# Patient Record
Sex: FEMALE | Race: WHITE | HISPANIC OR LATINO | Employment: OTHER | URBAN - METROPOLITAN AREA
[De-identification: names, ages, dates, MRNs, and addresses within clinical notes are randomized per-mention and may not be internally consistent; named-entity substitution may affect disease eponyms.]

---

## 2023-01-01 ENCOUNTER — HOME CARE VISIT (OUTPATIENT)
Dept: HOME HOSPICE | Facility: HOSPICE | Age: 88
End: 2023-01-01

## 2023-01-01 ENCOUNTER — HOSPICE ADMISSION (OUTPATIENT)
Dept: HOME HOSPICE | Facility: HOSPICE | Age: 88
End: 2023-01-01

## 2023-01-01 ENCOUNTER — HOME CARE VISIT (OUTPATIENT)
Dept: HOME HEALTH SERVICES | Facility: HOME HEALTHCARE | Age: 88
End: 2023-01-01

## 2023-01-30 ENCOUNTER — APPOINTMENT (EMERGENCY)
Dept: RADIOLOGY | Facility: HOSPITAL | Age: 88
End: 2023-01-30

## 2023-01-30 ENCOUNTER — HOME CARE VISIT (OUTPATIENT)
Dept: HOME HEALTH SERVICES | Facility: HOME HEALTHCARE | Age: 88
End: 2023-01-30

## 2023-01-30 ENCOUNTER — HOSPITAL ENCOUNTER (EMERGENCY)
Facility: HOSPITAL | Age: 88
Discharge: HOME | End: 2023-01-30
Attending: EMERGENCY MEDICINE

## 2023-01-30 VITALS
TEMPERATURE: 98.1 F | WEIGHT: 125 LBS | RESPIRATION RATE: 21 BRPM | HEIGHT: 64 IN | OXYGEN SATURATION: 94 % | BODY MASS INDEX: 21.34 KG/M2 | DIASTOLIC BLOOD PRESSURE: 89 MMHG | SYSTOLIC BLOOD PRESSURE: 181 MMHG | HEART RATE: 50 BPM

## 2023-01-30 DIAGNOSIS — R77.8 ELEVATED TROPONIN LEVEL NOT DUE MYOCARDIAL INFARCTION: ICD-10-CM

## 2023-01-30 DIAGNOSIS — F03.90 DEMENTIA (HCC): Primary | ICD-10-CM

## 2023-01-30 DIAGNOSIS — R00.1 BRADYCARDIA: ICD-10-CM

## 2023-01-30 DIAGNOSIS — R62.7 ADULT FAILURE TO THRIVE: ICD-10-CM

## 2023-01-30 DIAGNOSIS — I10 HYPERTENSION, UNSPECIFIED TYPE: ICD-10-CM

## 2023-01-30 LAB
ALBUMIN SERPL BCP-MCNC: 2.5 G/DL (ref 3.5–5)
ALP SERPL-CCNC: 103 U/L (ref 46–116)
ALT SERPL W P-5'-P-CCNC: 22 U/L (ref 12–78)
ANION GAP SERPL CALCULATED.3IONS-SCNC: 7 MMOL/L (ref 4–13)
AST SERPL W P-5'-P-CCNC: 26 U/L (ref 5–45)
BASE EXCESS BLDA CALC-SCNC: 4 MMOL/L (ref -2–3)
BASOPHILS # BLD AUTO: 0.01 THOUSANDS/ÂΜL (ref 0–0.1)
BASOPHILS NFR BLD AUTO: 0 % (ref 0–1)
BILIRUB SERPL-MCNC: 0.5 MG/DL (ref 0.2–1)
BUN SERPL-MCNC: 28 MG/DL (ref 5–25)
CA-I BLD-SCNC: 1.17 MMOL/L (ref 1.12–1.32)
CALCIUM ALBUM COR SERPL-MCNC: 9.6 MG/DL (ref 8.3–10.1)
CALCIUM SERPL-MCNC: 8.4 MG/DL (ref 8.3–10.1)
CARDIAC TROPONIN I PNL SERPL HS: 45 NG/L
CHLORIDE SERPL-SCNC: 111 MMOL/L (ref 96–108)
CK SERPL-CCNC: 25 U/L (ref 26–192)
CO2 SERPL-SCNC: 30 MMOL/L (ref 21–32)
CREAT SERPL-MCNC: 1.23 MG/DL (ref 0.6–1.3)
EOSINOPHIL # BLD AUTO: 0.08 THOUSAND/ÂΜL (ref 0–0.61)
EOSINOPHIL NFR BLD AUTO: 1 % (ref 0–6)
ERYTHROCYTE [DISTWIDTH] IN BLOOD BY AUTOMATED COUNT: 13.2 % (ref 11.6–15.1)
GFR SERPL CREATININE-BSD FRML MDRD: 36 ML/MIN/1.73SQ M
GLUCOSE SERPL-MCNC: 160 MG/DL (ref 65–140)
GLUCOSE SERPL-MCNC: 164 MG/DL (ref 65–140)
HCO3 BLDA-SCNC: 29.7 MMOL/L (ref 24–30)
HCT VFR BLD AUTO: 37.7 % (ref 34.8–46.1)
HCT VFR BLD CALC: 34 % (ref 34.8–46.1)
HGB BLD-MCNC: 12 G/DL (ref 11.5–15.4)
HGB BLDA-MCNC: 11.6 G/DL (ref 11.5–15.4)
IMM GRANULOCYTES # BLD AUTO: 0.02 THOUSAND/UL (ref 0–0.2)
IMM GRANULOCYTES NFR BLD AUTO: 0 % (ref 0–2)
LYMPHOCYTES # BLD AUTO: 0.98 THOUSANDS/ÂΜL (ref 0.6–4.47)
LYMPHOCYTES NFR BLD AUTO: 17 % (ref 14–44)
MCH RBC QN AUTO: 30.1 PG (ref 26.8–34.3)
MCHC RBC AUTO-ENTMCNC: 31.8 G/DL (ref 31.4–37.4)
MCV RBC AUTO: 95 FL (ref 82–98)
MONOCYTES # BLD AUTO: 0.47 THOUSAND/ÂΜL (ref 0.17–1.22)
MONOCYTES NFR BLD AUTO: 8 % (ref 4–12)
NEUTROPHILS # BLD AUTO: 4.2 THOUSANDS/ÂΜL (ref 1.85–7.62)
NEUTS SEG NFR BLD AUTO: 74 % (ref 43–75)
NRBC BLD AUTO-RTO: 0 /100 WBCS
PCO2 BLD: 31 MMOL/L (ref 21–32)
PCO2 BLD: 46.7 MM HG (ref 42–50)
PH BLD: 7.41 [PH] (ref 7.3–7.4)
PLATELET # BLD AUTO: 220 THOUSANDS/UL (ref 149–390)
PMV BLD AUTO: 11.5 FL (ref 8.9–12.7)
PO2 BLD: 22 MM HG (ref 35–45)
POTASSIUM BLD-SCNC: 4.8 MMOL/L (ref 3.5–5.3)
POTASSIUM SERPL-SCNC: 4.7 MMOL/L (ref 3.5–5.3)
PROT SERPL-MCNC: 6.3 G/DL (ref 6.4–8.4)
RBC # BLD AUTO: 3.99 MILLION/UL (ref 3.81–5.12)
SAO2 % BLD FROM PO2: 38 % (ref 60–85)
SODIUM BLD-SCNC: 147 MMOL/L (ref 136–145)
SODIUM SERPL-SCNC: 148 MMOL/L (ref 135–147)
SPECIMEN SOURCE: ABNORMAL
TSH SERPL DL<=0.05 MIU/L-ACNC: 1.44 UIU/ML (ref 0.45–4.5)
WBC # BLD AUTO: 5.76 THOUSAND/UL (ref 4.31–10.16)

## 2023-01-30 RX ORDER — SODIUM CHLORIDE, SODIUM GLUCONATE, SODIUM ACETATE, POTASSIUM CHLORIDE, MAGNESIUM CHLORIDE, SODIUM PHOSPHATE, DIBASIC, AND POTASSIUM PHOSPHATE .53; .5; .37; .037; .03; .012; .00082 G/100ML; G/100ML; G/100ML; G/100ML; G/100ML; G/100ML; G/100ML
250 INJECTION, SOLUTION INTRAVENOUS ONCE
Status: COMPLETED | OUTPATIENT
Start: 2023-01-30 | End: 2023-01-30

## 2023-01-30 RX ADMIN — SODIUM CHLORIDE, SODIUM GLUCONATE, SODIUM ACETATE, POTASSIUM CHLORIDE, MAGNESIUM CHLORIDE, SODIUM PHOSPHATE, DIBASIC, AND POTASSIUM PHOSPHATE 250 ML: .53; .5; .37; .037; .03; .012; .00082 INJECTION, SOLUTION INTRAVENOUS at 19:04

## 2023-01-30 RX ADMIN — SODIUM CHLORIDE 250 ML: 0.9 INJECTION, SOLUTION INTRAVENOUS at 15:32

## 2023-01-30 NOTE — ED PROVIDER NOTES
History  Chief Complaint   Patient presents with   • Failure To Thrive     Patient comes to the ER via EMS from home with report of failure to thrive  80-year-old female brought by ambulance from home due to failure to thrive  She has dementia and is not able to converse for approximately 1 year  She also does not ambulate and is bedridden  Family takes care of her and seems to be doing a very good job  Family had COVID infection about 2 weeks ago and noted that the patient is coughing more than before but not in any respiratory distress  Appetite has decreased for several months but she is still taking Ensure and small amounts of food  She is refusing her medications currently even if they are disguised in sauce or food  She has not had a doctor for over 1 year  Family would like her to be comfortable and do not want aggressive measures  They do feel that she has been agitated at times and they are not sure if this is due to pain  They say that she is still wetting diapers and having normal bowel movements  No recent trauma  None       Past Medical History:   Diagnosis Date   • Diabetes mellitus ([facility] )        Past Surgical History:   Procedure Laterality Date   • CARDIAC PACEMAKER PLACEMENT         History reviewed  No pertinent family history  I have reviewed and agree with the history as documented  E-Cigarette/Vaping   • E-Cigarette Use Never User      E-Cigarette/Vaping Substances     Social History     Tobacco Use   • Smoking status: Never   • Smokeless tobacco: Never   Vaping Use   • Vaping Use: Never used   Substance Use Topics   • Alcohol use: Never   • Drug use: Never       Review of Systems   Unable to perform ROS: Dementia       Physical Exam  Physical Exam  Vitals and nursing note reviewed  Constitutional:       General: She is not in acute distress  Appearance: She is well-developed  She is not ill-appearing or diaphoretic     HENT:      Head: Normocephalic and atraumatic  Right Ear: External ear normal       Left Ear: External ear normal       Nose: Nose normal    Eyes:      General: No scleral icterus  Conjunctiva/sclera: Conjunctivae normal       Pupils: Pupils are equal, round, and reactive to light  Cardiovascular:      Rate and Rhythm: Regular rhythm  Bradycardia present  Heart sounds: No murmur heard  Comments: Distant heart sounds and radial pulses  Daughter states this is normal for the patient  Pulmonary:      Effort: Pulmonary effort is normal       Breath sounds: Normal breath sounds  Abdominal:      General: Bowel sounds are normal       Palpations: Abdomen is soft  There is no mass  Tenderness: There is no abdominal tenderness  There is no guarding or rebound  Musculoskeletal:         General: Normal range of motion  Cervical back: Normal range of motion and neck supple  Right lower leg: No edema  Left lower leg: No edema  Skin:     General: Skin is warm and dry  Capillary Refill: Capillary refill takes less than 2 seconds  Findings: No bruising or rash  Neurological:      Mental Status: She is alert  She is disoriented  GCS: GCS eye subscore is 4  GCS verbal subscore is 3  GCS motor subscore is 4  Cranial Nerves: No cranial nerve deficit  Deep Tendon Reflexes: Reflexes are normal and symmetric  Comments: Does not cooperate  Does not communicate    No lateralizing signs appreciated         Vital Signs  ED Triage Vitals   Temperature Pulse Respirations Blood Pressure SpO2   01/30/23 1427 01/30/23 1418 01/30/23 1418 01/30/23 1457 01/30/23 1418   98 1 °F (36 7 °C) (!) 51 18 (!) 181/89 100 %      Temp Source Heart Rate Source Patient Position - Orthostatic VS BP Location FiO2 (%)   01/30/23 1427 01/30/23 1418 -- -- --   Temporal Monitor         Pain Score       01/30/23 1418       No Pain           Vitals:    01/30/23 1418 01/30/23 1457   BP:  (!) 181/89   Pulse: (!) 51 (!) 50 Visual Acuity      ED Medications  Medications   multi-electrolyte (PLASMALYTE-A/ISOLYTE-S PH 7 4) IV solution 250 mL (has no administration in time range)   sodium chloride 0 9 % bolus 250 mL (0 mL Intravenous Stopped 1/30/23 1630)       Diagnostic Studies  Results Reviewed     Procedure Component Value Units Date/Time    HS Troponin I 2hr [[id number]]     Lab Status: No result Specimen: Blood     HS Troponin I 4hr [[id number]]     Lab Status: No result Specimen: Blood     HS Troponin 0hr (reflex protocol) [[id number]]  (Normal) Collected: 01/30/23 1530    Lab Status: Final result Specimen: Blood from Arm, Right Updated: 01/30/23 1614     hs TnI 0hr 45 ng/L     TSH [[id number]]  (Normal) Collected: 01/30/23 1530    Lab Status: Final result Specimen: Blood from Arm, Right Updated: 01/30/23 1608     TSH 3RD GENERATON 1 440 uIU/mL     Narrative:      Patients undergoing fluorescein dye angiography may retain small amounts of fluorescein in the body for 48-72 hours post procedure  Samples containing fluorescein can produce falsely depressed TSH values  If the patient had this procedure,a specimen should be resubmitted post fluorescein clearance        Comprehensive metabolic panel [[id number]]  (Abnormal) Collected: 01/30/23 1530    Lab Status: Final result Specimen: Blood from Arm, Right Updated: 01/30/23 1600     Sodium 148 mmol/L      Potassium 4 7 mmol/L      Chloride 111 mmol/L      CO2 30 mmol/L      ANION GAP 7 mmol/L      BUN 28 mg/dL      Creatinine 1 23 mg/dL      Glucose 160 mg/dL      Calcium 8 4 mg/dL      Corrected Calcium 9 6 mg/dL      AST 26 U/L      ALT 22 U/L      Alkaline Phosphatase 103 U/L      Total Protein 6 3 g/dL      Albumin 2 5 g/dL      Total Bilirubin 0 50 mg/dL      eGFR 36 ml/min/1 73sq m     Narrative:      [facility] guidelines for Chronic Kidney Disease (CKD):   •  Stage 1 with normal or high GFR (GFR > 90 mL/min/1 73 square meters)  •  Stage 2 Mild CKD (GFR = 60-89 mL/min/1 73 square meters)  •  Stage 3A Moderate CKD (GFR = 45-59 mL/min/1 73 square meters)  •  Stage 3B Moderate CKD (GFR = 30-44 mL/min/1 73 square meters)  •  Stage 4 Severe CKD (GFR = 15-29 mL/min/1 73 square meters)  •  Stage 5 End Stage CKD (GFR <15 mL/min/1 73 square meters)  Note: GFR calculation is accurate only with a steady state creatinine    CK Total with Reflex CKMB [[id number]]  (Abnormal) Collected: 01/30/23 1530    Lab Status: Final result Specimen: Blood from Arm, Right Updated: 01/30/23 1600     Total CK 25 U/L     POCT Blood Gas (CG8+) [[id number]]  (Abnormal) Collected: 01/30/23 1538    Lab Status: Final result Specimen: Venous Updated: 01/30/23 1541     ph, Jreemy ISTAT 7 411     pCO2, Jeremy i-STAT 46 7 mm HG      pO2, Jeremy i-STAT 22 0 mm HG      BE, i-STAT 4 mmol/L      HCO3, Jeremy i-STAT 29 7 mmol/L      CO2, i-STAT 31 mmol/L      O2 Sat, i-STAT 38 %      SODIUM, I-STAT 147 mmol/l      Potassium, i-STAT 4 8 mmol/L      Calcium, Ionized i-STAT 1 17 mmol/L      Hct, i-STAT 34 %      Hgb, i-STAT 11 6 g/dl      Glucose, i-STAT 164 mg/dl      Specimen Type VENOUS    CBC and differential [[id number]] Collected: 01/30/23 1530    Lab Status: Final result Specimen: Blood from Arm, Right Updated: 01/30/23 1540     WBC 5 76 Thousand/uL      RBC 3 99 Million/uL      Hemoglobin 12 0 g/dL      Hematocrit 37 7 %      MCV 95 fL      MCH 30 1 pg      MCHC 31 8 g/dL      RDW 13 2 %      MPV 11 5 fL      Platelets 816 Thousands/uL      nRBC 0 /100 WBCs      Neutrophils Relative 74 %      Immat GRANS % 0 %      Lymphocytes Relative 17 %      Monocytes Relative 8 %      Eosinophils Relative 1 %      Basophils Relative 0 %      Neutrophils Absolute 4 20 Thousands/µL      Immature Grans Absolute 0 02 Thousand/uL      Lymphocytes Absolute 0 98 Thousands/µL      Monocytes Absolute 0 47 Thousand/µL      Eosinophils Absolute 0 08 Thousand/µL      Basophils Absolute 0 01 Thousands/µL                  XR chest 1 view portable   ED Interpretation by Leeroy Jefferson DO (01/30 0608)   Rotated  Cannot exclude left basilar infiltrate or effusion  No other obvious infiltrates  Pacemaker and leads appear to be in correct position                 Procedures  ECG 12 Lead Documentation Only    Date/Time: 1/30/2023 4:40 PM  Performed by: Leeroy Jefferson DO  Authorized by: Leeroy Jefferson DO     ECG reviewed by me, the ED Provider: yes    Patient location:  ED  Previous ECG:     Previous ECG:  Unavailable    Comparison to cardiac monitor: Yes    Rhythm:     Rhythm: paced    Pacing:     Capture:  Complete    Type of pacing:  Ventricular             ED Course  ED Course as of 01/30/23 Kenia Milton Jan 30, 2023   1618 Patient's daughter states that family wants comfort measures and no aggressive treatment  They are interested in home hospice if she is stable for discharge  1850 Case management sent this chart to hospice  They will contact the family for follow-up tomorrow  Plan is for discharge  Patient is stable  Family is aware and agreeable  Waiting for BLS transport  1900 No respiratory distress, no coughing while in this department                                             Medical Decision Making  Very elderly female with dementia has been eating less than usual   She occasionally appears to be in distress but it is unclear because her dementia so significant  There is been no fever, vomiting, diarrhea  There was some coughing earlier in the week but that has resolved  We will check for dehydration, electrolyte abnormality, DKA, ACS, bacterial infection  Family does not want any aggressive measures taken but they would accept IV fluids and medicines if needed  They are interested in hospice at home  Results are encouraging  Patient slightly volume depleted  IV fluids given  No signs of CHF  Does not meet criteria for admission  Case management discussed patient with hospice    They will try to review the ED chart tonight so that a case file can be opened for her in the morning  Adult failure to thrive: acute illness or injury  Bradycardia: acute illness or injury  Dementia [facility]): acute illness or injury  Elevated troponin level not due myocardial infarction: acute illness or injury  Hypertension, unspecified type: acute illness or injury  Amount and/or Complexity of Data Reviewed  Labs: ordered  Radiology: ordered and independent interpretation performed  Risk  Prescription drug management  Disposition  Final diagnoses:   Dementia ([place] [address] )   Adult failure to thrive   Elevated troponin level not due myocardial infarction   Hypertension, unspecified type   Bradycardia     Time reflects when diagnosis was documented in both MDM as applicable and the Disposition within this note     Time User Action Codes Description Comment    1/30/2023  4:17 PM Gonzalo Sink Add [F03 90] Dementia ([facility] )     1/30/2023  4:17 PM Gonzalo Sink Add [R62 7] Adult failure to thrive     1/30/2023  4:17 PM Gonzalo Sink Add [E87 0] Hypernatremia     1/30/2023  4:18 PM Adriana Chapa [R77 8] Elevated troponin level not due myocardial infarction     1/30/2023  4:20 PM Khalif Yung Add [I10] Hypertension, unspecified type     1/30/2023  4:20 PM Gonzalo Sink Add [R00 1] Bradycardia     1/30/2023  6:37 PM Gonzalo Sink Remove [E87 0] Hypernatremia       ED Disposition     ED Disposition   Discharge    Condition   Stable    Date/Time   Mon Jan 30, 2023  6:37 PM    Comment   Mars Avina discharge to home/self care                 Follow-up Information     Follow up With Specialties Details Why Contact Info    Je[facility] Snell [facility]  Follow up [facility]NA TO Jeana Stubbs [address] [facility] [address] [address]  [phone] [address]  Call in 1 day  [phone]            Patient's Medications    No medications on file       No discharge procedures on file      PDMP Review     None          ED Provider  Electronically Signed by           Jannie Bejarano DO  01/30/23 5752

## 2023-01-30 NOTE — HOSPICE NOTE
Received referral  Once note is in and labs are finalized I will work on approval and reach out to family  CarmelitaTRICIA Weems

## 2023-01-30 NOTE — DISCHARGE INSTRUCTIONS
Today's labs, x-ray, EKG and exam do not show any significant illness  Our  discussed your mother's situation with the hospice team   They should call you tomorrow  If you do not hear from them by mid afternoon I recommend you call the [employer] to ask them for help in contacting the hospice team as well as a local PCP  Continue your excellent care of your mother

## 2023-01-31 NOTE — ED NOTES
Contacted patient's family to let them know ambulance transport will be arriving shortly     Gio Perkins, [address] [facility]  01/30/23 9592

## 2023-02-01 ENCOUNTER — HOME CARE VISIT (OUTPATIENT)
Dept: HOME HOSPICE | Facility: HOSPICE | Age: 88
End: 2023-02-01

## 2023-02-01 ENCOUNTER — HOME CARE VISIT (OUTPATIENT)
Dept: HOME HEALTH SERVICES | Facility: HOME HEALTHCARE | Age: 88
End: 2023-02-01

## 2023-02-01 VITALS — HEART RATE: 60 BPM | DIASTOLIC BLOOD PRESSURE: 80 MMHG | SYSTOLIC BLOOD PRESSURE: 160 MMHG | RESPIRATION RATE: 16 BRPM

## 2023-02-01 LAB
ATRIAL RATE: 46 BPM
QRS AXIS: -69 DEGREES
QRSD INTERVAL: 138 MS
QT INTERVAL: 556 MS
QTC INTERVAL: 506 MS
T WAVE AXIS: 111 DEGREES
VENTRICULAR RATE: 50 BPM

## 2023-02-02 ENCOUNTER — HOME CARE VISIT (OUTPATIENT)
Dept: HOME HOSPICE | Facility: HOSPICE | Age: 88
End: 2023-02-02

## 2023-02-03 ENCOUNTER — HOME CARE VISIT (OUTPATIENT)
Dept: HOME HOSPICE | Facility: HOSPICE | Age: 88
End: 2023-02-03

## 2023-02-03 DIAGNOSIS — G31.1 SENILE DEGENERATION OF BRAIN (HCC): ICD-10-CM

## 2023-02-03 DIAGNOSIS — Z51.5 HOSPICE CARE: Primary | ICD-10-CM

## 2023-02-03 RX ORDER — MORPHINE SULFATE 100 MG/5ML
5 SOLUTION, CONCENTRATE ORAL EVERY 4 HOURS PRN
Qty: 30 ML | Refills: 0 | Status: SHIPPED | OUTPATIENT
Start: 2023-02-03

## 2023-02-03 RX ORDER — HALOPERIDOL 2 MG/ML
SOLUTION ORAL
Qty: 30 ML | Refills: 0 | Status: SHIPPED | OUTPATIENT
Start: 2023-02-03

## 2023-02-03 RX ORDER — LORAZEPAM 2 MG/ML
CONCENTRATE ORAL
Qty: 30 ML | Refills: 0 | Status: SHIPPED | OUTPATIENT
Start: 2023-02-03

## 2023-02-03 NOTE — PROGRESS NOTES
2/3/2023 4:01 PM  [facility] home patient requests emergency fill until [facility] order arrives  Filled electronically via Epic as per [place] State Law  Requested Prescriptions     Signed Prescriptions Disp Refills   • Morphine Sulfate, Concentrate, 20 mg/mL concentrated solution 30 mL 0     Sig: Take 0 25 mL (5 mg total) by mouth every 4 (four) hours as needed (pain / SOB) Max Daily Amount: 30 mg   • LORazepam (ATIVAN) 2 mg/mL concentrated solution 30 mL 0     Sig: Take 0 5 mL (1 mg total) by mouth every 8 (eight) hours  May also take 0 5 mL (1 mg total) every 4 (four) hours as needed for anxiety (restlessness/dyspnea)  • haloperidol (HALDOL) oral concentrated solution 30 mL 0     Sig: Take 0 5 mL (1 mg total) by mouth every 8 (eight) hours  May also take 0 5 mL (1 mg total) every 4 (four) hours as needed for agitation (nausea/vomiting)  Jenny Rudolph, [address] [facility]  Hospice Answering Service: [phone]  You can find me on [employer]!

## 2023-02-04 ENCOUNTER — HOME CARE VISIT (OUTPATIENT)
Dept: HOME HEALTH SERVICES | Facility: HOME HEALTHCARE | Age: 88
End: 2023-02-04

## 2023-02-24 ENCOUNTER — HOME CARE VISIT (OUTPATIENT)
Dept: HOME HOSPICE | Facility: HOSPICE | Age: 88
End: 2023-02-24

## 2023-04-29 NOTE — CASE MANAGEMENT
Case Management Progress Note    Patient name Madison Soto  Location ED 10/ED 10 MRN [id number]  : 1925 Date 2023       LOS (days): 0  Geometric Mean LOS (GMLOS) (days):   Days to GMLOS:        OBJECTIVE:        Current admission status: Emergency  Preferred Pharmacy:   [place] [address]  Phone: [phone] Fax: [phone]    Primary Care Provider: No primary care provider on file  Primary Insurance: MEDICARE  Secondary Insurance:     PROGRESS NOTE: Cm consulted regarding hospice  Pt lives with her dtr  They have a wc and a bed with siderails for her at home  Pt is bed bound  She does not have a PCP  Cm spoke with pts dtr at bedside  Info link card provided to get pt established with a PCP  Cm spoke with Hospice liaison Pauline Meehan  Pt does not need a PCP to return home on hospice  Cm still suggested to pts dtr to get a PCP and ask for a video visit  Referral sent to [facility] for evaluation for home hospice   Cm following 29.5